# Patient Record
Sex: FEMALE | NOT HISPANIC OR LATINO | Employment: UNEMPLOYED | URBAN - METROPOLITAN AREA
[De-identification: names, ages, dates, MRNs, and addresses within clinical notes are randomized per-mention and may not be internally consistent; named-entity substitution may affect disease eponyms.]

---

## 2020-11-13 ENCOUNTER — TRANSCRIBE ORDERS (OUTPATIENT)
Dept: ADMINISTRATIVE | Facility: HOSPITAL | Age: 20
End: 2020-11-13

## 2020-11-13 DIAGNOSIS — M25.512 LEFT SHOULDER PAIN, UNSPECIFIED CHRONICITY: Primary | ICD-10-CM

## 2020-11-30 ENCOUNTER — HOSPITAL ENCOUNTER (OUTPATIENT)
Dept: MRI IMAGING | Facility: HOSPITAL | Age: 20
Discharge: HOME | End: 2020-11-30
Admitting: FAMILY MEDICINE

## 2020-11-30 DIAGNOSIS — M25.512 LEFT SHOULDER PAIN, UNSPECIFIED CHRONICITY: ICD-10-CM

## 2020-11-30 PROCEDURE — 73221 MRI JOINT UPR EXTREM W/O DYE: CPT

## 2024-08-02 ENCOUNTER — OFFICE VISIT (OUTPATIENT)
Dept: FAMILY MEDICINE CLINIC | Facility: CLINIC | Age: 24
End: 2024-08-02
Payer: COMMERCIAL

## 2024-08-02 VITALS
DIASTOLIC BLOOD PRESSURE: 68 MMHG | BODY MASS INDEX: 17.89 KG/M2 | SYSTOLIC BLOOD PRESSURE: 108 MMHG | WEIGHT: 101 LBS | RESPIRATION RATE: 16 BRPM | TEMPERATURE: 97 F | HEIGHT: 63 IN | OXYGEN SATURATION: 99 % | HEART RATE: 93 BPM

## 2024-08-02 DIAGNOSIS — Z00.00 ROUTINE GENERAL MEDICAL EXAMINATION AT A HEALTH CARE FACILITY: Primary | ICD-10-CM

## 2024-08-02 DIAGNOSIS — Z13.6 SCREENING FOR CARDIOVASCULAR CONDITION: ICD-10-CM

## 2024-08-02 PROCEDURE — 99385 PREV VISIT NEW AGE 18-39: CPT | Performed by: FAMILY MEDICINE

## 2024-08-02 RX ORDER — KETOCONAZOLE 20 MG/ML
SHAMPOO TOPICAL DAILY PRN
COMMUNITY
Start: 2024-07-16

## 2024-08-02 RX ORDER — NORETHINDRONE ACETATE AND ETHINYL ESTRADIOL 1MG-20(21)
1 KIT ORAL DAILY
COMMUNITY
Start: 2024-07-16

## 2024-08-02 RX ORDER — LEVOCETIRIZINE DIHYDROCHLORIDE 5 MG/1
1 TABLET, FILM COATED ORAL DAILY
COMMUNITY
Start: 2024-07-16

## 2024-08-02 NOTE — PROGRESS NOTES
New Patient History and Physical      Referring Physician: No ref. provider found    Chief Complaint:    Chief Complaint   Patient presents with    Establish Care     Pt is establishing care today and needs blood work. Dental/vision are up to date. No concerns just needs regular check up.        History of Present Illness:   History of Present Illness  The patient is a 23-year-old female who presents for establishing with a new primary care physician for annual/preventative healthcare needs.    She reports no current health issues or concerns.  She maintains an active lifestyle and reports no issues with his dietary habits.  She is eating and sleep patterns are normal, and he denies experiencing chest pain or shortness of breath.    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    FAMILY HISTORY  Her grandmother had diabetes and heart problems.  Her grandfather had heart problems.     Denies chest pain, syncope, palpitations or vertigo.  Denies fever, chills or night sweats.  Maintains an active lifestyle, balanced dietary intake; reports good hydration habits.  Denies hematuria/dysuria.  Denies any BRB/BTS.  No new rashes.  Denies orthopnea, epistaxis or hemoptysis.      Subjective     Review of Systems     Constitutional: Negative for fever. Negative for chills, diaphoresis, fatigue and unexpected weight change.   HENT: No dysphagia; no changes to vision/hearing/smell/taste; no epistaxis  Eyes: Negative for redness and visual disturbance.   Respiratory: negative for shortness of breath. Negative for chest pain . Negative for cough and chest tightness.   Cardiovascular: Negative for chest pain and palpitations.   Gastrointestinal: Negative for abdominal distention, abdominal pain and blood in stool.   Endocrine: Negative for cold intolerance and heat intolerance.   Genitourinary: Negative for difficulty urinating, dysuria and  "frequency.   Musculoskeletal: Negative for arthralgias, back pain and myalgias.   Skin: Negative for color change, rash and wound.   Neurological: Negative for syncope, weakness and headaches.   Hematological: Negative for adenopathy. Does not bruise/bleed easily.   Psychiatric/Behavioral: Negative for confusion. The patient is not nervous/anxious.    The following portions of the patient's history were reviewed and updated as appropriate: allergies, current medications, past family history, past medical history, past social history, past surgical history and problem list.    Past Medical History: History reviewed. No pertinent past medical history.    Past Surgical History:History reviewed. No pertinent surgical history.    Family History: family history is not on file.    Social History:  reports that she has never smoked. She has never used smokeless tobacco. She reports current alcohol use of about 1.0 standard drink of alcohol per week. She reports that she does not use drugs.    Medications:    Current Outpatient Medications:     ketoconazole (NIZORAL) 2 % shampoo, Apply  topically to the appropriate area as directed Daily As Needed., Disp: , Rfl:     levocetirizine (XYZAL) 5 MG tablet, Take 1 tablet by mouth Daily., Disp: , Rfl:     norethindrone-ethinyl estradiol FE (JUNEL FE 1/20) 1-20 MG-MCG per tablet, Take 1 tablet by mouth Daily., Disp: , Rfl:     Allergies:  No Known Allergies    Objective     Physical Exam:  Vital Signs: /68   Pulse 93   Temp 97 °F (36.1 °C) (Temporal)   Resp 16   Ht 160 cm (62.99\")   Wt 45.8 kg (101 lb)   SpO2 99%   BMI 17.90 kg/m²    BMI is below normal parameters (malnutrition). Recommendations: none (medical contraindication)    General Appearance: alert, oriented x 3, no acute distress.  Skin: warm and dry.   HEENT: Atraumatic.  pupils round and reactive to light and accommodation, oral mucosa pink and moist.  Nares patent without epistaxis.  External auditory canals " are patent tympanic membranes intact.  Neck: supple, no JVD, trachea midline.  No thyromegaly  Lungs: CTA, unlabored breathing effort.  Heart: RRR, normal S1 and S2, no S3, no rub.  Abdomen: soft, non-tender, no palpable bladder, present bowel sounds to auscultation ×4.  No guarding or rigidity.  Extremities: no clubbing, cyanosis or edema.  Good range of motion actively and passively.  Symmetric muscle strength and development  Neuro: normal speech and mental status.  Cranial nerves II through XII intact.  No anosmia. DTR 2+; proprioception intact.  No focal motor/sensory deficits.        Assessment / Plan     Assessment/Plan:   Diagnoses and all orders for this visit:    1. Routine general medical examination at a health care facility (Primary)  -     CBC & Differential  -     Comprehensive Metabolic Panel  -     Lipid Panel    2. Screening for cardiovascular condition  -     CBC & Differential  -     Comprehensive Metabolic Panel  -     Lipid Panel      Patient is a well-nourished and developed female.  In excellent physical health.  No concerning risk factors at present.      She does require metabolic baseline laboratory evaluation today including CBC/CMP/lipid panel.    I have discussed age/gender specific preventative healthcare issues in detail with patient today.  I have answered all of the questions.    Vital signs demonstrate hemodynamic stability.      Discussion Summary:  Discussed plan of care in detail with pt today; pt verb understanding and agrees.    I spent 40 minutes caring for June on this date of service. This time includes time spent by me in the following activities:preparing for the visit, obtaining and/or reviewing a separately obtained history, performing a medically appropriate examination and/or evaluation , counseling and educating the patient/family/caregiver, ordering medications, tests, or procedures, documenting information in the medical record, and care coordination    I have  reviewed and updated all copied forward information, as appropriate.  I attest to the accuracy and relevance of any unchanged information.    Follow up:  Return in about 1 year (around 8/2/2025) for Annual physical.     Patient Instructions   Preventive Care 21-39 Years Old, Female  Preventive care refers to lifestyle choices and visits with your health care provider that can promote health and wellness. Preventive care visits are also called wellness exams.  What can I expect for my preventive care visit?  Counseling  During your preventive care visit, your health care provider may ask about your:  Medical history, including:  Past medical problems.  Family medical history.  Pregnancy history.  Current health, including:  Menstrual cycle.  Method of birth control.  Emotional well-being.  Home life and relationship well-being.  Sexual activity and sexual health.  Lifestyle, including:  Alcohol, nicotine or tobacco, and drug use.  Access to firearms.  Diet, exercise, and sleep habits.  Work and work environment.  Sunscreen use.  Safety issues such as seatbelt and bike helmet use.  Physical exam  Your health care provider may check your:  Height and weight. These may be used to calculate your BMI (body mass index). BMI is a measurement that tells if you are at a healthy weight.  Waist circumference. This measures the distance around your waistline. This measurement also tells if you are at a healthy weight and may help predict your risk of certain diseases, such as type 2 diabetes and high blood pressure.  Heart rate and blood pressure.  Body temperature.  Skin for abnormal spots.  What immunizations do I need?    Vaccines are usually given at various ages, according to a schedule. Your health care provider will recommend vaccines for you based on your age, medical history, and lifestyle or other factors, such as travel or where you work.  What tests do I need?  Screening  Your health care provider may recommend  screening tests for certain conditions. This may include:  Pelvic exam and Pap test.  Lipid and cholesterol levels.  Diabetes screening. This is done by checking your blood sugar (glucose) after you have not eaten for a while (fasting).  Hepatitis B test.  Hepatitis C test.  HIV (human immunodeficiency virus) test.  STI (sexually transmitted infection) testing, if you are at risk.  BRCA-related cancer screening. This may be done if you have a family history of breast, ovarian, tubal, or peritoneal cancers.  Talk with your health care provider about your test results, treatment options, and if necessary, the need for more tests.  Follow these instructions at home:  Eating and drinking    Eat a healthy diet that includes fresh fruits and vegetables, whole grains, lean protein, and low-fat dairy products.  Take vitamin and mineral supplements as recommended by your health care provider.  Do not drink alcohol if:  Your health care provider tells you not to drink.  You are pregnant, may be pregnant, or are planning to become pregnant.  If you drink alcohol:  Limit how much you have to 0-1 drink a day.  Know how much alcohol is in your drink. In the [place] one drink equals one 12 oz bottle of beer (355 mL), one 5 oz glass of wine (148 mL), or one 1½ oz glass of hard liquor (44 mL).  Lifestyle  Brush your teeth every morning and night with fluoride toothpaste. Floss one time each day.  Exercise for at least 30 minutes 5 or more days each week.  Do not use any products that contain nicotine or tobacco. These products include cigarettes, chewing tobacco, and vaping devices, such as e-cigarettes. If you need help quitting, ask your health care provider.  Do not use drugs.  If you are sexually active, practice safe sex. Use a condom or other form of protection to prevent STIs.  If you do not wish to become pregnant, use a form of birth control. If you plan to become pregnant, see your health care provider for a prepregnancy  visit.  Find healthy ways to manage stress, such as:  Meditation, yoga, or listening to music.  Journaling.  Talking to a trusted person.  Spending time with friends and family.  Minimize exposure to UV radiation to reduce your risk of skin cancer.  Safety  Always wear your seat belt while driving or riding in a vehicle.  Do not drive:  If you have been drinking alcohol. Do not ride with someone who has been drinking.  If you have been using any mind-altering substances or drugs.  While texting.  When you are tired or distracted.  Wear a helmet and other protective equipment during sports activities.  If you have firearms in your house, make sure you follow all gun safety procedures.  Seek help if you have been physically or sexually abused.  What's next?  Go to your health care provider once a year for an annual wellness visit.  Ask your health care provider how often you should have your eyes and teeth checked.  Stay up to date on all vaccines.  This information is not intended to replace advice given to you by your health care provider. Make sure you discuss any questions you have with your health care provider.  Document Revised: 06/15/2022 Document Reviewed: 06/15/2022  Elsevier Patient Education © 2024 [employer]     Quincy Trinidad DO          Patient or patient representative verbalized consent for the use of Ambient Listening during the visit with  Quincy Trinidad DO for chart documentation. 8/2/2024  15:09 EDT        Quincy Trinidad DO  08/02/24  15:10 EDT

## 2024-08-03 LAB
ALBUMIN SERPL-MCNC: 4.4 G/DL (ref 4–5)
ALP SERPL-CCNC: 41 IU/L (ref 44–121)
ALT SERPL-CCNC: 11 IU/L (ref 0–32)
AST SERPL-CCNC: 18 IU/L (ref 0–40)
BASOPHILS # BLD AUTO: 0 X10E3/UL (ref 0–0.2)
BASOPHILS NFR BLD AUTO: 1 %
BILIRUB SERPL-MCNC: 0.6 MG/DL (ref 0–1.2)
BUN SERPL-MCNC: 10 MG/DL (ref 6–20)
BUN/CREAT SERPL: 13 (ref 9–23)
CALCIUM SERPL-MCNC: 9.8 MG/DL (ref 8.7–10.2)
CHLORIDE SERPL-SCNC: 103 MMOL/L (ref 96–106)
CHOLEST SERPL-MCNC: 153 MG/DL (ref 100–199)
CO2 SERPL-SCNC: 22 MMOL/L (ref 20–29)
CREAT SERPL-MCNC: 0.8 MG/DL (ref 0.57–1)
EGFRCR SERPLBLD CKD-EPI 2021: 106 ML/MIN/1.73
EOSINOPHIL # BLD AUTO: 0 X10E3/UL (ref 0–0.4)
EOSINOPHIL NFR BLD AUTO: 1 %
ERYTHROCYTE [DISTWIDTH] IN BLOOD BY AUTOMATED COUNT: 11.2 % (ref 11.7–15.4)
GLOBULIN SER CALC-MCNC: 2.4 G/DL (ref 1.5–4.5)
GLUCOSE SERPL-MCNC: 88 MG/DL (ref 70–99)
HCT VFR BLD AUTO: 43.9 % (ref 34–46.6)
HDLC SERPL-MCNC: 66 MG/DL
HGB BLD-MCNC: 14.2 G/DL (ref 11.1–15.9)
IMM GRANULOCYTES # BLD AUTO: 0 X10E3/UL (ref 0–0.1)
IMM GRANULOCYTES NFR BLD AUTO: 0 %
LDLC SERPL CALC-MCNC: 74 MG/DL (ref 0–99)
LYMPHOCYTES # BLD AUTO: 1.6 X10E3/UL (ref 0.7–3.1)
LYMPHOCYTES NFR BLD AUTO: 29 %
MCH RBC QN AUTO: 31.6 PG (ref 26.6–33)
MCHC RBC AUTO-ENTMCNC: 32.3 G/DL (ref 31.5–35.7)
MCV RBC AUTO: 98 FL (ref 79–97)
MONOCYTES # BLD AUTO: 0.4 X10E3/UL (ref 0.1–0.9)
MONOCYTES NFR BLD AUTO: 7 %
NEUTROPHILS # BLD AUTO: 3.5 X10E3/UL (ref 1.4–7)
NEUTROPHILS NFR BLD AUTO: 62 %
PLATELET # BLD AUTO: 256 X10E3/UL (ref 150–450)
POTASSIUM SERPL-SCNC: 4.3 MMOL/L (ref 3.5–5.2)
PROT SERPL-MCNC: 6.8 G/DL (ref 6–8.5)
RBC # BLD AUTO: 4.49 X10E6/UL (ref 3.77–5.28)
SODIUM SERPL-SCNC: 139 MMOL/L (ref 134–144)
TRIGL SERPL-MCNC: 64 MG/DL (ref 0–149)
VLDLC SERPL CALC-MCNC: 13 MG/DL (ref 5–40)
WBC # BLD AUTO: 5.6 X10E3/UL (ref 3.4–10.8)

## 2024-09-23 ENCOUNTER — TELEPHONE (OUTPATIENT)
Dept: FAMILY MEDICINE CLINIC | Facility: CLINIC | Age: 24
End: 2024-09-23

## 2024-09-23 NOTE — TELEPHONE ENCOUNTER
Caller: Alden Fernándezannah    Relationship: Self    Best call back number: [phone]     Caller requesting test results: LABS    What test was performed: LABS     When was the test performed: AUG 2     Where was the test performed: OFFICE     Additional notes: PLEASE CALL WITH RESULTS. ESTABLISHED PATIENT OF DR TUBBS.